# Patient Record
Sex: FEMALE | Race: WHITE
[De-identification: names, ages, dates, MRNs, and addresses within clinical notes are randomized per-mention and may not be internally consistent; named-entity substitution may affect disease eponyms.]

---

## 2022-09-13 ENCOUNTER — OFFICE VISIT (OUTPATIENT)
Dept: FAMILY MEDICINE CLINIC | Facility: CLINIC | Age: 22
End: 2022-09-13
Payer: COMMERCIAL

## 2022-09-13 ENCOUNTER — TELEPHONE (OUTPATIENT)
Dept: FAMILY MEDICINE CLINIC | Facility: CLINIC | Age: 22
End: 2022-09-13

## 2022-09-13 VITALS
DIASTOLIC BLOOD PRESSURE: 66 MMHG | HEART RATE: 94 BPM | WEIGHT: 103 LBS | BODY MASS INDEX: 16.55 KG/M2 | SYSTOLIC BLOOD PRESSURE: 90 MMHG | OXYGEN SATURATION: 100 % | TEMPERATURE: 98.4 F | HEIGHT: 66 IN

## 2022-09-13 DIAGNOSIS — Z13.220 SCREENING CHOLESTEROL LEVEL: ICD-10-CM

## 2022-09-13 DIAGNOSIS — R63.4 WEIGHT LOSS: ICD-10-CM

## 2022-09-13 DIAGNOSIS — F33.0 MILD EPISODE OF RECURRENT MAJOR DEPRESSIVE DISORDER (HCC): Primary | ICD-10-CM

## 2022-09-13 DIAGNOSIS — R53.83 OTHER FATIGUE: ICD-10-CM

## 2022-09-13 LAB
BASOPHILS # BLD: 0 K/UL (ref 0–0.2)
BASOPHILS NFR BLD: 1 % (ref 0–2)
DIFFERENTIAL METHOD BLD: NORMAL
EOSINOPHIL # BLD: 0 K/UL (ref 0–0.8)
EOSINOPHIL NFR BLD: 1 % (ref 0.5–7.8)
ERYTHROCYTE [DISTWIDTH] IN BLOOD BY AUTOMATED COUNT: 12.9 % (ref 11.9–14.6)
HCT VFR BLD AUTO: 45.3 % (ref 35.8–46.3)
HGB BLD-MCNC: 14.4 G/DL (ref 11.7–15.4)
IMM GRANULOCYTES # BLD AUTO: 0 K/UL (ref 0–0.5)
IMM GRANULOCYTES NFR BLD AUTO: 0 % (ref 0–5)
LYMPHOCYTES # BLD: 1.7 K/UL (ref 0.5–4.6)
LYMPHOCYTES NFR BLD: 39 % (ref 13–44)
MCH RBC QN AUTO: 29.7 PG (ref 26.1–32.9)
MCHC RBC AUTO-ENTMCNC: 31.8 G/DL (ref 31.4–35)
MCV RBC AUTO: 93.4 FL (ref 79.6–97.8)
MONOCYTES # BLD: 0.3 K/UL (ref 0.1–1.3)
MONOCYTES NFR BLD: 7 % (ref 4–12)
NEUTS SEG # BLD: 2.3 K/UL (ref 1.7–8.2)
NEUTS SEG NFR BLD: 52 % (ref 43–78)
NRBC # BLD: 0 K/UL (ref 0–0.2)
PLATELET # BLD AUTO: 263 K/UL (ref 150–450)
PMV BLD AUTO: 9.5 FL (ref 9.4–12.3)
RBC # BLD AUTO: 4.85 M/UL (ref 4.05–5.2)
WBC # BLD AUTO: 4.4 K/UL (ref 4.3–11.1)

## 2022-09-13 PROCEDURE — 99203 OFFICE O/P NEW LOW 30 MIN: CPT | Performed by: NURSE PRACTITIONER

## 2022-09-13 RX ORDER — NORETHINDRONE ACETATE AND ETHINYL ESTRADIOL AND FERROUS FUMARATE 1MG-20(24)
KIT ORAL
COMMUNITY
Start: 2022-09-12

## 2022-09-13 RX ORDER — PAROXETINE HYDROCHLORIDE 20 MG/1
20 TABLET, FILM COATED ORAL DAILY
COMMUNITY
Start: 2022-07-07

## 2022-09-13 RX ORDER — AMOXICILLIN 875 MG/1
875 TABLET, COATED ORAL 2 TIMES DAILY
COMMUNITY
Start: 2021-11-22 | End: 2022-09-13

## 2022-09-13 RX ORDER — HYDROXYZINE HYDROCHLORIDE 10 MG/1
TABLET, FILM COATED ORAL
COMMUNITY
Start: 2021-03-31

## 2022-09-13 RX ORDER — PAROXETINE 30 MG/1
TABLET, FILM COATED ORAL
COMMUNITY
Start: 2021-03-31 | End: 2022-09-13

## 2022-09-13 RX ORDER — NAPROXEN 375 MG/1
TABLET ORAL
COMMUNITY
Start: 2021-05-03 | End: 2022-09-13

## 2022-09-13 ASSESSMENT — PATIENT HEALTH QUESTIONNAIRE - PHQ9
2. FEELING DOWN, DEPRESSED OR HOPELESS: 1
1. LITTLE INTEREST OR PLEASURE IN DOING THINGS: 1
SUM OF ALL RESPONSES TO PHQ9 QUESTIONS 1 & 2: 2
SUM OF ALL RESPONSES TO PHQ QUESTIONS 1-9: 2

## 2022-09-13 ASSESSMENT — ENCOUNTER SYMPTOMS
EYES NEGATIVE: 1
GASTROINTESTINAL NEGATIVE: 1
ALLERGIC/IMMUNOLOGIC NEGATIVE: 1
RESPIRATORY NEGATIVE: 1

## 2022-09-13 NOTE — PROGRESS NOTES
[address]  [address] [facility] Russ Parra, [address] [facility]   (ph) [phone] (fax) [phone]  FREEMAN Lobo-C      Chief Complaint   Patient presents with    New Patient    Establish Care       24 yo female comes in to get established. She reports recently moving here from [place] and needs a PCP. Reports an 11 lb weight loss in 2 months. She reports a hx of anxiety and depression. She is scheduled to see a mental health nurse practitioner on Monday. LMP today. Her main concern today is the weight loss. Feels it may be related to anxiety but wants to make sure. No Known Allergies    Past Medical History:   Diagnosis Date    Anxiety     Mild episode of recurrent major depressive disorder ([facility] [address]        History reviewed. No pertinent family history. Social History     Socioeconomic History    Marital status: Unknown     Spouse name: Not on file    Number of children: Not on file    Years of education: Not on file    Highest education level: Not on file   Occupational History    Not on file   Tobacco Use    Smoking status: Never    Smokeless tobacco: Never   Substance and Sexual Activity    Alcohol use: Yes     Comment: rarely    Drug use: Never    Sexual activity: Not on file   Other Topics Concern    Not on file   Social History Narrative    Not on file     Social Determinants of Health     Financial Resource Strain: Not on file   Food Insecurity: Not on file   Transportation Needs: Not on file   Physical Activity: Not on file   Stress: Not on file   Social Connections: Not on file   Intimate Partner Violence: Not on file   Housing Stability: Not on file       OB History    No obstetric history on file. No past surgical history on file.     Health Maintenance   Topic Date Due    COVID-19 Vaccine (1) Never done    Depression Screen  Never done    HIV screen  Never done    Chlamydia screen  Never done    Hepatitis C screen  Never done    Flu vaccine (1) 09/01/2022    DTaP/Tdap/Td vaccine (7 - Td or Tdap) 09/08/2024    Hepatitis B vaccine  Completed    Hib vaccine  Completed    HPV vaccine  Completed    Varicella vaccine  Completed    Hepatitis A vaccine  Aged Out    Meningococcal (ACWY) vaccine  Aged Out    Pneumococcal 0-64 years Vaccine  Aged Out         Current Outpatient Medications:     Norethin Ace-Eth Estrad-FE 1-20 MG-MCG(24) CHEW, CHEW 1 TABLET BY MOUTH DAILY FOR 28 DAYS., Disp: , Rfl:     hydrOXYzine HCl (ATARAX) 10 MG tablet, 1 po q12 hrs prn anxiety, Disp: , Rfl:     PARoxetine (PAXIL) 20 MG tablet, Take 20 mg by mouth daily, Disp: , Rfl:     Review of Systems   Constitutional:  Positive for fatigue. HENT: Negative. Eyes: Negative. Respiratory: Negative. Cardiovascular: Negative. Gastrointestinal: Negative. Endocrine: Negative. Genitourinary: Negative. Musculoskeletal: Negative. Skin: Negative. Allergic/Immunologic: Negative. Neurological: Negative. Hematological: Negative. Psychiatric/Behavioral: Negative. Vitals:    09/13/22 1152   BP: 90/66   Pulse: 94   Temp: 98.4 °F (36.9 °C)   SpO2: 100%        Physical Exam  Constitutional:       Appearance: Normal appearance. HENT:      Head: Normocephalic. Nose: Nose normal.   Eyes:      Extraocular Movements: Extraocular movements intact. Pupils: Pupils are equal, round, and reactive to light. Cardiovascular:      Rate and Rhythm: Normal rate and regular rhythm. Pulmonary:      Effort: Pulmonary effort is normal.      Breath sounds: Normal breath sounds. Abdominal:      General: Abdomen is flat. Palpations: Abdomen is soft. Musculoskeletal:         General: Normal range of motion. Cervical back: Normal range of motion and neck supple. Skin:     General: Skin is warm and dry. Neurological:      General: No focal deficit present. Mental Status: She is alert and oriented to person, place, and time.    Psychiatric: Mood and Affect: Mood normal.         Behavior: Behavior normal.        PHQ-9=2      Assessment & Plan:    1. Mild episode of recurrent major depressive disorder ([address]  Stable; continue Paxil and Hydroxyzine; seeing mental health Monday    2. Weight loss  - TSH; Future  - Comprehensive Metabolic Panel; Future    3. Other fatigue  - CBC with Auto Differential; Future  - Comprehensive Metabolic Panel; Future    4. Screening cholesterol level  - Lipid Panel; Future     Greater than 50% counseling and/or coordination of care: the treatment regimen is extensive; detailed review. Will notify of labs. Follow-up prn. This note was dictated using dragon voice recognition software. It has been proofread, but there may still exist voice recognition errors that the author did not detect.       Signed By: BRETT Frankel Se - CNP     September 13, 2022

## 2022-09-13 NOTE — TELEPHONE ENCOUNTER
Patient is wanting referral to behavioral health so she can get gene sight testing done,. Please advise.     Thank you

## 2022-09-14 LAB
ALBUMIN SERPL-MCNC: 4 G/DL (ref 3.5–5)
ALBUMIN/GLOB SERPL: 1.1 {RATIO} (ref 1.2–3.5)
ALP SERPL-CCNC: 44 U/L (ref 50–136)
ALT SERPL-CCNC: 18 U/L (ref 12–65)
ANION GAP SERPL CALC-SCNC: 2 MMOL/L (ref 4–13)
AST SERPL-CCNC: 17 U/L (ref 15–37)
BILIRUB SERPL-MCNC: 0.4 MG/DL (ref 0.2–1.1)
BUN SERPL-MCNC: 11 MG/DL (ref 6–23)
CALCIUM SERPL-MCNC: 9.7 MG/DL (ref 8.3–10.4)
CHLORIDE SERPL-SCNC: 107 MMOL/L (ref 101–110)
CHOLEST SERPL-MCNC: 149 MG/DL
CO2 SERPL-SCNC: 25 MMOL/L (ref 21–32)
CREAT SERPL-MCNC: 0.7 MG/DL (ref 0.6–1)
GLOBULIN SER CALC-MCNC: 3.7 G/DL (ref 2.3–3.5)
GLUCOSE SERPL-MCNC: 57 MG/DL (ref 65–100)
HDLC SERPL-MCNC: 46 MG/DL (ref 40–60)
HDLC SERPL: 3.2 {RATIO}
LDLC SERPL CALC-MCNC: 95.4 MG/DL
POTASSIUM SERPL-SCNC: 4.7 MMOL/L (ref 3.5–5.1)
PROT SERPL-MCNC: 7.7 G/DL (ref 6.3–8.2)
SODIUM SERPL-SCNC: 134 MMOL/L (ref 136–145)
TRIGL SERPL-MCNC: 38 MG/DL (ref 35–150)
TSH, 3RD GENERATION: 1.18 UIU/ML (ref 0.36–3.74)
VLDLC SERPL CALC-MCNC: 7.6 MG/DL (ref 6–23)

## 2022-12-09 ENCOUNTER — NURSE TRIAGE (OUTPATIENT)
Dept: OTHER | Facility: CLINIC | Age: 22
End: 2022-12-09

## 2022-12-09 ENCOUNTER — OFFICE VISIT (OUTPATIENT)
Dept: FAMILY MEDICINE CLINIC | Facility: CLINIC | Age: 22
End: 2022-12-09

## 2022-12-09 VITALS
TEMPERATURE: 98.1 F | WEIGHT: 104.4 LBS | BODY MASS INDEX: 16.78 KG/M2 | OXYGEN SATURATION: 99 % | HEART RATE: 100 BPM | HEIGHT: 66 IN | DIASTOLIC BLOOD PRESSURE: 64 MMHG | SYSTOLIC BLOOD PRESSURE: 114 MMHG

## 2022-12-09 DIAGNOSIS — L08.9 ABRASION OF FINGER WITH INFECTION, INITIAL ENCOUNTER: ICD-10-CM

## 2022-12-09 DIAGNOSIS — S60.419A ABRASION OF FINGER WITH INFECTION, INITIAL ENCOUNTER: ICD-10-CM

## 2022-12-09 DIAGNOSIS — T14.8XXA PUNCTURE WOUND: Primary | ICD-10-CM

## 2022-12-09 PROBLEM — J30.9 ALLERGIC RHINITIS: Status: ACTIVE | Noted: 2018-01-26

## 2022-12-09 PROBLEM — F41.9 ANXIETY: Status: ACTIVE | Noted: 2018-01-26

## 2022-12-09 PROBLEM — M41.9 SCOLIOSIS: Status: ACTIVE | Noted: 2018-01-26

## 2022-12-09 RX ORDER — AMOXICILLIN AND CLAVULANATE POTASSIUM 875; 125 MG/1; MG/1
1 TABLET, FILM COATED ORAL 2 TIMES DAILY
Qty: 14 TABLET | Refills: 0 | Status: SHIPPED | OUTPATIENT
Start: 2022-12-09 | End: 2022-12-16

## 2022-12-09 RX ORDER — FLUOXETINE 10 MG/1
10 CAPSULE ORAL DAILY
COMMUNITY

## 2022-12-09 SDOH — ECONOMIC STABILITY: TRANSPORTATION INSECURITY
IN THE PAST 12 MONTHS, HAS THE LACK OF TRANSPORTATION KEPT YOU FROM MEDICAL APPOINTMENTS OR FROM GETTING MEDICATIONS?: NO

## 2022-12-09 SDOH — ECONOMIC STABILITY: FOOD INSECURITY: WITHIN THE PAST 12 MONTHS, YOU WORRIED THAT YOUR FOOD WOULD RUN OUT BEFORE YOU GOT MONEY TO BUY MORE.: NEVER TRUE

## 2022-12-09 SDOH — ECONOMIC STABILITY: TRANSPORTATION INSECURITY
IN THE PAST 12 MONTHS, HAS LACK OF TRANSPORTATION KEPT YOU FROM MEETINGS, WORK, OR FROM GETTING THINGS NEEDED FOR DAILY LIVING?: NO

## 2022-12-09 SDOH — ECONOMIC STABILITY: FOOD INSECURITY: WITHIN THE PAST 12 MONTHS, THE FOOD YOU BOUGHT JUST DIDN'T LAST AND YOU DIDN'T HAVE MONEY TO GET MORE.: NEVER TRUE

## 2022-12-09 ASSESSMENT — LIFESTYLE VARIABLES
HOW OFTEN DO YOU HAVE A DRINK CONTAINING ALCOHOL: NEVER
HOW MANY STANDARD DRINKS CONTAINING ALCOHOL DO YOU HAVE ON A TYPICAL DAY: PATIENT DOES NOT DRINK

## 2022-12-09 ASSESSMENT — PATIENT HEALTH QUESTIONNAIRE - PHQ9
9. THOUGHTS THAT YOU WOULD BE BETTER OFF DEAD, OR OF HURTING YOURSELF: 0
3. TROUBLE FALLING OR STAYING ASLEEP: 0
6. FEELING BAD ABOUT YOURSELF - OR THAT YOU ARE A FAILURE OR HAVE LET YOURSELF OR YOUR FAMILY DOWN: 0
10. IF YOU CHECKED OFF ANY PROBLEMS, HOW DIFFICULT HAVE THESE PROBLEMS MADE IT FOR YOU TO DO YOUR WORK, TAKE CARE OF THINGS AT HOME, OR GET ALONG WITH OTHER PEOPLE: 0
SUM OF ALL RESPONSES TO PHQ QUESTIONS 1-9: 0
7. TROUBLE CONCENTRATING ON THINGS, SUCH AS READING THE NEWSPAPER OR WATCHING TELEVISION: 0
SUM OF ALL RESPONSES TO PHQ QUESTIONS 1-9: 0
SUM OF ALL RESPONSES TO PHQ QUESTIONS 1-9: 0
4. FEELING TIRED OR HAVING LITTLE ENERGY: 0
1. LITTLE INTEREST OR PLEASURE IN DOING THINGS: 0
SUM OF ALL RESPONSES TO PHQ QUESTIONS 1-9: 0
SUM OF ALL RESPONSES TO PHQ9 QUESTIONS 1 & 2: 0
8. MOVING OR SPEAKING SO SLOWLY THAT OTHER PEOPLE COULD HAVE NOTICED. OR THE OPPOSITE, BEING SO FIGETY OR RESTLESS THAT YOU HAVE BEEN MOVING AROUND A LOT MORE THAN USUAL: 0
2. FEELING DOWN, DEPRESSED OR HOPELESS: 0
5. POOR APPETITE OR OVEREATING: 0

## 2022-12-09 ASSESSMENT — SOCIAL DETERMINANTS OF HEALTH (SDOH): HOW HARD IS IT FOR YOU TO PAY FOR THE VERY BASICS LIKE FOOD, HOUSING, MEDICAL CARE, AND HEATING?: NOT HARD AT ALL
